# Patient Record
Sex: FEMALE | Race: WHITE | Employment: FULL TIME | URBAN - METROPOLITAN AREA
[De-identification: names, ages, dates, MRNs, and addresses within clinical notes are randomized per-mention and may not be internally consistent; named-entity substitution may affect disease eponyms.]

---

## 2022-03-19 PROBLEM — Z30.09 FAMILY PLANNING: Status: ACTIVE | Noted: 2019-12-16

## 2022-03-19 PROBLEM — Z87.42 HISTORY OF MENORRHAGIA: Status: ACTIVE | Noted: 2019-12-16

## 2022-05-22 ENCOUNTER — CLINICAL DOCUMENTATION (OUTPATIENT)
Dept: OBGYN CLINIC | Age: 32
End: 2022-05-22

## 2022-05-24 ENCOUNTER — TELEPHONE (OUTPATIENT)
Dept: OBGYN CLINIC | Age: 32
End: 2022-05-24

## 2022-05-24 NOTE — TELEPHONE ENCOUNTER
Attempted to contact patient to discuss possible dates for recommended surgery per Dr Paulino [facility] with ablation). N/A, L/M on voicemail for patient to return my call at her earliest convenience.     Pt called and surgery is scheduled for 7/27/22 per patients request.

## 2022-06-01 ENCOUNTER — HOSPITAL ENCOUNTER (OUTPATIENT)
Dept: MAMMOGRAPHY | Age: 32
Discharge: HOME | End: 2022-06-04
Payer: MEDICAID

## 2022-06-01 ENCOUNTER — APPOINTMENT (OUTPATIENT)
Dept: MAMMOGRAPHY | Age: 32
End: 2022-06-01
Payer: MEDICAID

## 2022-06-01 DIAGNOSIS — N63.10 MASS OF RIGHT BREAST: ICD-10-CM

## 2022-06-01 PROCEDURE — 76642 ULTRASOUND BREAST LIMITED: CPT

## 2022-06-01 PROCEDURE — 77066 DX MAMMO INCL CAD BI: CPT

## 2022-06-02 ENCOUNTER — PREP FOR PROCEDURE (OUTPATIENT)
Dept: OBGYN CLINIC | Age: 32
End: 2022-06-02

## 2022-06-02 NOTE — RESULT ENCOUNTER NOTE
Notify patient MMG and bilateral breast USs  are nl, keep an eye on her SBE, let us know if anything changes or she has concerns. Radiologist advises clinical FU, schedule a FU breast recheck ~ 12/2022    Radiologist's note:  \"Clinical follow-up of right breast lump is STRONGLY RECOMMENDED,  however. If any clinical suspicion for breast malignancy remains, then further  assessment should be currently performed. By imaging, a contrasted breast MRI  should be considered. Otherwise, the patient should be referred back sooner if  worrisome clinical findings arise such as enlarging lump or new focal symptoms  otherwise such as pain. This was also discussed with the patient who stated  Understanding. \"

## 2022-06-03 PROBLEM — N92.0 MENORRHAGIA: Status: ACTIVE | Noted: 2022-06-03

## 2022-06-06 ENCOUNTER — TELEPHONE (OUTPATIENT)
Dept: OBGYN CLINIC | Age: 32
End: 2022-06-06

## 2022-06-06 NOTE — TELEPHONE ENCOUNTER
Pt notified of following results:  - pap smear ascus (can't rule out HGSIL), HPV+. - vaginal screening negative (no yeast, bv, etc.)  - urine culture negative. Explained pap results, need for colpo. Scheduled colpo. Pt also informed that pre-op and post-op appointments set up for her would not work; rescheduled appointments to fit pt's schedule. Pt verbalized understanding, has no further questions or concerns at this time.

## 2022-06-06 NOTE — TELEPHONE ENCOUNTER
Notify pt:   1.  pap result (ascus can't r/o HGSIL, + HPV) needs colpo, please schedule with me   2.  Neg vaginal screening (no yeast, BV, trich)   3.  Neg urine cx     LM to return call to discuss results

## 2022-06-21 ENCOUNTER — PROCEDURE VISIT (OUTPATIENT)
Dept: OBGYN CLINIC | Age: 32
End: 2022-06-21
Payer: MEDICAID

## 2022-06-21 VITALS
BODY MASS INDEX: 29.81 KG/M2 | SYSTOLIC BLOOD PRESSURE: 118 MMHG | WEIGHT: 162 LBS | HEIGHT: 62 IN | DIASTOLIC BLOOD PRESSURE: 72 MMHG

## 2022-06-21 DIAGNOSIS — R87.611 PAP SMEAR OF CERVIX WITH ASCUS, CANNOT EXCLUDE HGSIL: ICD-10-CM

## 2022-06-21 DIAGNOSIS — Z01.812 PRE-PROCEDURE LAB EXAM: Primary | ICD-10-CM

## 2022-06-21 DIAGNOSIS — R87.810 ASCUS WITH POSITIVE HIGH RISK HPV CERVICAL: ICD-10-CM

## 2022-06-21 DIAGNOSIS — R87.610 ASCUS WITH POSITIVE HIGH RISK HPV CERVICAL: ICD-10-CM

## 2022-06-21 LAB
HCG, PREGNANCY, URINE, POC: NEGATIVE
VALID INTERNAL CONTROL, POC: YES

## 2022-06-21 PROCEDURE — 88305 TISSUE EXAM BY PATHOLOGIST: CPT

## 2022-06-21 PROCEDURE — 81025 URINE PREGNANCY TEST: CPT | Performed by: OBSTETRICS & GYNECOLOGY

## 2022-06-21 PROCEDURE — 57454 BX/CURETT OF CERVIX W/SCOPE: CPT | Performed by: OBSTETRICS & GYNECOLOGY

## 2022-06-22 ENCOUNTER — HOSPITAL ENCOUNTER (OUTPATIENT)
Dept: LAB | Age: 32
Discharge: HOME | End: 2022-06-25
Payer: MEDICAID

## 2022-06-28 PROBLEM — R87.611 PAP SMEAR OF CERVIX WITH ASCUS, CANNOT EXCLUDE HGSIL: Status: ACTIVE | Noted: 2022-06-28

## 2022-06-28 NOTE — PROGRESS NOTES
Colposcopy  Em Rosales  34 y.o.  1990    Today:  6/21/2022    Em Rosales presents today for a colposcopy procedure. Pap smear flow sheet:  5/16/22 pap:  ASCUS, can't r/o HGSIL, + HPV    Patient's last menstrual period was 06/19/2022. BC:   Depo-Provera injections (wants to stop depo provera). Allergies   Allergen Reactions    Amoxicillin-Pot Clavulanate Hives    Ciprofloxacin Other (See Comments)    Penicillins Other (See Comments)       Current Outpatient Medications   Medication Sig    albuterol (PROVENTIL) (2.5 MG/3ML) 0.083% nebulizer solution Inhale into the lungs once    buPROPion (WELLBUTRIN XL) 300 MG extended release tablet Take 300 mg by mouth    cetirizine (ZYRTEC) 10 MG tablet Take by mouth    escitalopram (LEXAPRO) 20 MG tablet Take 20 mg by mouth    ibuprofen (ADVIL;MOTRIN) 200 MG tablet Take by mouth    LORazepam (ATIVAN) 0.5 MG tablet Take by mouth. topiramate (TOPAMAX) 25 MG tablet Take by mouth 2 times daily     No current facility-administered medications for this visit.        Past Medical History:   Diagnosis Date    ADD (attention deficit disorder)     Allergic rhinitis     Asthma     Exacerbation 12/21/2020 seen at [facility] ER    Depression     Diabetes [facility])     gestational    Hypertension     Migraine     Psychiatric disorder     anxiety    Right wrist sprain 03/05/2021    [facility], Worker's Comp 3/2/2021    Sleep disorder     Vertigo        Past Surgical History:   Procedure Laterality Date    CHOLECYSTECTOMY         Family History   Problem Relation Age of Onset    Diabetes Mother     Deep Vein Thrombosis Neg Hx     Prostate Cancer Neg Hx     Ovarian Cancer Neg Hx     Breast Cancer Neg Hx     Colon Cancer Paternal Grandmother     Coronary Art Dis Maternal Aunt     Heart Disease Maternal Grandmother     Thyroid Disease Maternal Grandmother     Hypertension Maternal Grandmother     Coronary Art Dis Maternal Grandmother     Diabetes Maternal Grandmother     Hypertension Mother     Cancer Mother         Cervical ca, hysterectomy    Pulmonary Embolism Neg Hx     Coronary Art Dis Mother        Physical Exam   /72   Ht 5' 2\" (1.575 m)   Wt 162 lb (73.5 kg)   LMP 06/19/2022   BMI 29.63 kg/m²     External genitalia: normal appearing no erythema or lesions  Vagina: pink with normal rugations, no lesions, physiologic appearing discharge  Cervix:  normal appearing, no lesions    Colposcopy procedure:  Consent signed and time out performed. Patient positioned in lithotomy position. A bivalve speculum was placed into the vagina. The cervix was bathed in acetic acid followed by lugols solution. Colposcopy observations:  Satisfactory colpo, 360 degrees of glandular epithelium seen. An ECC was obtained. Acetowhite patch (lugols positive) noted @ 3, 6 & 9:00, bxs obtained at:  3, 6 & 9:00   Monsel's solution was applied for good hemostasis. Patient tolerated procedure well. A/P:  1. Abnormal Pap: Will notify pt of biopsy results and advise follow up treatment accordingly. 2.  Menorrhagia, sched'd for D & C Hysteroscopy w/ EA. Unclear what pt plans to do for contraception. FU 2 wk w/ US    3. Recent breast imaging-->  6/1/2022 Bilateral Dx'tic MMG and bilateral breast US   Noted @ last PE:    Right breast: 11:00 7 cm from the nipple -->nl  Noted during dx'tic MMG:  Left breast 4:00--> 5 x 3 x 5 mm benign cyst     Radiologist advised clinical FU, FU breast recheck scheduled~ 12/6/2022    Orders Placed This Encounter   Procedures    COLPOSC,CERVIX W/ADJ VAG,W/BX & CURRETAG     1. ECC 2. 3 o'clock 3. 6 o'clock 4. 9 o'clock    [facility] Surgical Pathology     1. ECC 2. 3 o'clock 3. 6 o'clock 4. 9 o'clock     Standing Status:   Future     Number of Occurrences:   1     Standing Expiration Date:   6/21/2023    AMB POC URINE PREGNANCY TEST, VISUAL COLOR COMPARISON       Dictated using voice recognition software.   Proofread but unrecognized errors may exist.    Margie Lowry MD, MD

## 2022-07-19 ENCOUNTER — TELEPHONE (OUTPATIENT)
Dept: OBGYN CLINIC | Age: 32
End: 2022-07-19

## 2022-07-19 RX ORDER — MEDROXYPROGESTERONE ACETATE 150 MG/ML
150 INJECTION, SUSPENSION INTRAMUSCULAR ONCE
Qty: 1 ML | Refills: 1 | Status: ON HOLD | OUTPATIENT
Start: 2022-07-19 | End: 2022-10-31 | Stop reason: HOSPADM

## 2022-07-19 NOTE — TELEPHONE ENCOUNTER
Pt called to cancel surgery and will call back when she is able to reschedule. Sugery and all upcoming appts pertaining to surgery have been cancelled.

## 2022-07-19 NOTE — TELEPHONE ENCOUNTER
Phone call from patient who has surgery scheduled 7-27-22 with Dr Oliver Moya but states she is sick with covid symptoms and will not be going forward with it at this time. Wants refill on depo until surgery can be rescheduled per patient. Rodri Mariscal made aware who will call patient regarding surgery and all appointments. 1 refill of depo sent to pharmacy and message to Dr Oliver Moya making her aware of patient's situation as well.

## 2022-08-24 ENCOUNTER — TELEPHONE (OUTPATIENT)
Dept: OBGYN CLINIC | Age: 32
End: 2022-08-24

## 2022-08-24 ENCOUNTER — CLINICAL DOCUMENTATION (OUTPATIENT)
Dept: OBGYN CLINIC | Age: 32
End: 2022-08-24

## 2022-08-24 DIAGNOSIS — N94.6 DYSMENORRHEA: ICD-10-CM

## 2022-08-24 DIAGNOSIS — R87.611 PAP SMEAR OF CERVIX WITH ASCUS, CANNOT EXCLUDE HGSIL: ICD-10-CM

## 2022-08-24 DIAGNOSIS — N92.4 MENORRHAGIA, PREMENOPAUSAL: Primary | ICD-10-CM

## 2022-08-24 RX ORDER — MEDROXYPROGESTERONE ACETATE 150 MG/ML
150 INJECTION, SUSPENSION INTRAMUSCULAR
Qty: 1 ML | Refills: 3 | Status: ON HOLD | OUTPATIENT
Start: 2022-08-24 | End: 2022-10-31 | Stop reason: HOSPADM

## 2022-08-24 NOTE — TELEPHONE ENCOUNTER
Attempted to contact patient to discuss possible dates for recommended surgery per Dr Phong [facility] Hysteroscopy D&C). N/A, L/M on voicemail for patient to return my call at her earliest convenience. Pt returned call and surgery is scheduled for 10/31/22.

## 2022-08-24 NOTE — RESULT ENCOUNTER NOTE
Called and d/w pt, had planned to discuss at her pre-op but surgery was cancelled. Advise CKC, pt is in agreement.

## 2022-08-30 ENCOUNTER — PREP FOR PROCEDURE (OUTPATIENT)
Dept: OBGYN CLINIC | Age: 32
End: 2022-08-30

## 2022-08-30 PROBLEM — N87.0 MILD DYSPLASIA OF CERVIX: Status: ACTIVE | Noted: 2022-08-30

## 2022-08-31 ENCOUNTER — CLINICAL DOCUMENTATION (OUTPATIENT)
Dept: OBGYN CLINIC | Age: 32
End: 2022-08-31

## 2022-08-31 NOTE — PROGRESS NOTES
Pt is scheduled for CKC and D&C on 10-31-22 with Dr Emily Martinez. Insurance pre-authorization is not required. Auth/Ref # non given. Pt has been notified of dates, times and locations.

## 2022-10-17 ENCOUNTER — OFFICE VISIT (OUTPATIENT)
Dept: OBGYN CLINIC | Age: 32
End: 2022-10-17

## 2022-10-17 VITALS — SYSTOLIC BLOOD PRESSURE: 128 MMHG | WEIGHT: 172.8 LBS | BODY MASS INDEX: 31.61 KG/M2 | DIASTOLIC BLOOD PRESSURE: 74 MMHG

## 2022-10-17 DIAGNOSIS — R87.610 ASCUS WITH POSITIVE HIGH RISK HPV CERVICAL: ICD-10-CM

## 2022-10-17 DIAGNOSIS — Z01.818 PRE-OP EXAM: Primary | ICD-10-CM

## 2022-10-17 DIAGNOSIS — N94.6 DYSMENORRHEA: ICD-10-CM

## 2022-10-17 DIAGNOSIS — R87.7 LOW GRADE SQUAMOUS INTRAEPITHELIAL LESION (LGSIL) ON BIOPSY OF CERVIX: ICD-10-CM

## 2022-10-17 DIAGNOSIS — N92.4 MENORRHAGIA, PREMENOPAUSAL: ICD-10-CM

## 2022-10-17 DIAGNOSIS — R87.810 ASCUS WITH POSITIVE HIGH RISK HPV CERVICAL: ICD-10-CM

## 2022-10-17 DIAGNOSIS — G89.18 POST-OP PAIN: ICD-10-CM

## 2022-10-17 RX ORDER — OXYCODONE HYDROCHLORIDE 5 MG/1
5 TABLET ORAL EVERY 6 HOURS PRN
Qty: 12 TABLET | Refills: 0 | Status: SHIPPED | OUTPATIENT
Start: 2022-10-17 | End: 2022-10-20

## 2022-10-17 RX ORDER — NAPROXEN 500 MG/1
500 TABLET ORAL 2 TIMES DAILY WITH MEALS
Qty: 20 TABLET | Refills: 0 | Status: SHIPPED | OUTPATIENT
Start: 2022-10-17

## 2022-10-17 RX ORDER — DEXTROAMPHETAMINE SACCHARATE, AMPHETAMINE ASPARTATE, DEXTROAMPHETAMINE SULFATE AND AMPHETAMINE SULFATE 5; 5; 5; 5 MG/1; MG/1; MG/1; MG/1
TABLET ORAL
COMMUNITY
Start: 2022-07-05

## 2022-10-17 NOTE — H&P (VIEW-ONLY)
Pre op  Exam    Rex Borrero  1990  34 y.o.  [id number]    Today: 10/17/2022    Presents for PRE-OP     1)  6/2022 Pap: ASCUS can't r/o HGSIL, colpo LGSIL. 2)  Menorrhagia & dysmenorrhea, wants concomitant D&C hysteroscopy, with endometrial ablation    Gets premenstrual cramping  States she is not opposed to hysterectomy if indicated. 1/2020 SF US:    Slightly Enlarged retroverted uterus = 5wks, 6.4/4.5/4, vol 62   Endometrium = 4.1mm and appears normal   Rt ovary is normal.   Lt ovary is normal.   No free fluid noted in the pelvis. 6/2022 Pap: ASCUS can't r/o HGSIL, colpo LGSIL. 6/2022 Colpo:  DIAGNOSIS        A:  \"ECC\":  BENIGN ENDOCERVICAL EPITHELIUM. B:  \"CERVICAL BIOPSY AT 3 O'CLOCK\":  BENIGN SQUAMOUS MUCOSA. C:  \"CERVICAL BIOPSY AT 6 O'CLOCK\":  LOW GRADE SQUAMOUS   INTRAEPITHELIAL LESION (LGSIL). D:  \"CERVICAL BIOPSY AT 9 O'CLOCK\":  BENIGN SQUAMOUS MUCOSA. No LMP recorded (lmp unknown). BC:   Depo-Provera injections. 6/21/2022   HPI:  ASCUS can't r/o HGSIL, colpo LGSIL. Menorrhagia & dysmenorrhea, wants concomitant D&C hysteroscopy, with endometrial ablation. Understands she needs that makes her life so much easier, fiber in the past that the [facility]  hysteroscopy has to be done after the [facility] to avoid compromising the endocervical canal but that is probably more of an issue when there is a high in likelihood of finding a high-grade lesion    Childbearing is complete. Current contraception: Depo-Provera  Pap smear flow sheet:  5/16/22 pap:  ASCUS, can't r/o HGSIL, + HPV    LMP 06/19/2022. BC:   Depo-Provera injections (wants to stop depo provera).     [id number], presents today with concerns regarding:          Review of Systems   Genitourinary:         Menorrhagia  Dysmenorrhea        Physical Exam:  /74   Wt 172 lb 12.8 oz (78.4 kg)   LMP  (LMP Unknown)   BMI 31.61 kg/m²   Rex Borrero is a 34 y.o. female who appears pleasant, in no apparent distress, her given age, well developed, well nourished and with good attention to hygiene and body habitus. Oriented to person, place and time. Mood and affect normal and appropriate to situation. Head is normocephalic, atraumatic, without any gross head or neck masses. Lymph nodes: No groin lymphadenopathy noted. Respiratory: Assessment of respiratory effort reveals even and non labored respirations. Lungs CTA. Cardiovascular: Heart auscultation reveals no murmurs, gallop, rubs or clicks. Skin: No skin rash, abnormal appearing nevi, subcutaneous nodules, lesions or ulcers observed. Abdomen: Abdomen soft, non tender, without palpable masses. Palpation of liver reveals no abnormalities with respect to size, tenderness or masses. Palpation of spleen reveals no abnormalities with respect to size, tenderness or masses. Genitourinary:   External genitalia are normal in appearance. Examination of urethral meatus reveals location normal and size normal.   Examination of urethra shows no abnormalities. Examination of vaginal vault reveals no abnormalities. Cervix: shows no pathology. Uterus:  normal size shape and contour  Adnexa and parametria show no masses, tenderness, organomegaly or nodularity. Examination of anus and perineum shows no abnormalities. A/P:  1. Pre op: CKC and D& C Hysteroscopy with endometrial ablation discussed with patient. She understands that complications aren't anticipated however if complications occur they could necessitate:   transfusion, HARJIT, ureteral stint, prolonged greer drainage, colostomy &/or other procedures as indicated. Pt understands/consents. The anticipated outcomes associated with Endometrial Ablation were reviewed --> 33% of patients have amenorrhea 33% have improved vaginal bleeding and 33% see no improvement the patient may even see a worsening of their symptoms.   I advised the patient that Adenomyosis may be associated with a poorer outcome. The diagnosis was discussed and all questions were answered to the satisfaction of the patient. The importance of continued reliable contraception following an ablation was discussed. BC:   Pt understands pregnancy is contraindicated following an ablation. Post op course  also discussed. Advised pelvic rest and not to engage in strenuous activity until her post op check ~ 2 wk post op. No driving for the first 48-72 hours post op or if she is taking narcotic pain medication. Pt is aware she will have some light VB for several days to weeks post op. All questions answered. Infection precautions reviewed. 2.  Clinical situation and discussed with Dr. Leigh Hubbard, he states it to combine the procedures and begin with the [facility]  hysteroscopy/endometrial ablation      Diagnosis Orders   1. Pre-op exam  CBC    Urine culture    Comprehensive Metabolic Panel    Comprehensive Metabolic Panel    CBC      2. Post-op pain  naproxen (EC-NAPROSYN) 500 MG EC tablet    oxyCODONE (ROXICODONE) 5 MG immediate release tablet      3. Menorrhagia, premenopausal        4. Dysmenorrhea        5. ASCUS with positive high risk HPV cervical        6. Low grade squamous intraepithelial lesion (LGSIL) on biopsy of cervix                Orders Placed This Encounter   Procedures    Urine culture     Order Specific Question:   Specify (ex-cath, midstream, cysto, etc)?      Answer:   midstream clean catch    CBC     Standing Status:   Future     Number of Occurrences:   1     Standing Expiration Date:   10/17/2023    Comprehensive Metabolic Panel     Standing Status:   Future     Number of Occurrences:   1     Standing Expiration Date:   10/17/2023     Orders Placed This Encounter   Medications    naproxen (EC-NAPROSYN) 500 MG EC tablet     Sig: Take 1 tablet by mouth 2 times daily (with meals) For postop pain     Dispense:  20 tablet     Refill:  0    oxyCODONE (ROXICODONE) 5 MG immediate release tablet     Sig: Take 1 tablet by mouth every 6 hours as needed for Pain for up to 3 days. Intended supply: 3 days.  Take lowest dose possible to manage pain     Dispense:  12 tablet     Refill:  0     Reduce doses taken as pain becomes manageable     Signed by:  Gemma Jacome MD, Blair Cedillo

## 2022-10-17 NOTE — PROGRESS NOTES
Pre op  Exam    Melvin Simms  1990  34 y.o.  [id number]    Today: 10/17/2022    Presents for PRE-OP     1)  6/2022 Pap: ASCUS can't r/o HGSIL, colpo LGSIL. 2)  Menorrhagia & dysmenorrhea, wants concomitant D&C hysteroscopy, with endometrial ablation    Gets premenstrual cramping  States she is not opposed to hysterectomy if indicated. 1/2020 SF US:    Slightly Enlarged retroverted uterus = 5wks, 6.4/4.5/4, vol 62   Endometrium = 4.1mm and appears normal   Rt ovary is normal.   Lt ovary is normal.   No free fluid noted in the pelvis. 6/2022 Pap: ASCUS can't r/o HGSIL, colpo LGSIL. 6/2022 Colpo:  DIAGNOSIS        A:  \"ECC\":  BENIGN ENDOCERVICAL EPITHELIUM. B:  \"CERVICAL BIOPSY AT 3 O'CLOCK\":  BENIGN SQUAMOUS MUCOSA. C:  \"CERVICAL BIOPSY AT 6 O'CLOCK\":  LOW GRADE SQUAMOUS   INTRAEPITHELIAL LESION (LGSIL). D:  \"CERVICAL BIOPSY AT 9 O'CLOCK\":  BENIGN SQUAMOUS MUCOSA. No LMP recorded (lmp unknown). BC:   Depo-Provera injections. 6/21/2022   HPI:  ASCUS can't r/o HGSIL, colpo LGSIL. Menorrhagia & dysmenorrhea, wants concomitant D&C hysteroscopy, with endometrial ablation. Understands she needs that makes her life so much easier, fiber in the past that the [facility]  hysteroscopy has to be done after the [facility] to avoid compromising the endocervical canal but that is probably more of an issue when there is a high in likelihood of finding a high-grade lesion    Childbearing is complete. Current contraception: Depo-Provera  Pap smear flow sheet:  5/16/22 pap:  ASCUS, can't r/o HGSIL, + HPV    LMP 06/19/2022. BC:   Depo-Provera injections (wants to stop depo provera).     [id number], presents today with concerns regarding:          Review of Systems   Genitourinary:         Menorrhagia  Dysmenorrhea        Physical Exam:  /74   Wt 172 lb 12.8 oz (78.4 kg)   LMP  (LMP Unknown)   BMI 31.61 kg/m²   Melvin Simms is a 34 y.o. female who appears pleasant, in no apparent distress, her given age, well developed, well nourished and with good attention to hygiene and body habitus. Oriented to person, place and time. Mood and affect normal and appropriate to situation. Head is normocephalic, atraumatic, without any gross head or neck masses. Lymph nodes: No groin lymphadenopathy noted. Respiratory: Assessment of respiratory effort reveals even and non labored respirations. Lungs CTA. Cardiovascular: Heart auscultation reveals no murmurs, gallop, rubs or clicks. Skin: No skin rash, abnormal appearing nevi, subcutaneous nodules, lesions or ulcers observed. Abdomen: Abdomen soft, non tender, without palpable masses. Palpation of liver reveals no abnormalities with respect to size, tenderness or masses. Palpation of spleen reveals no abnormalities with respect to size, tenderness or masses. Genitourinary:   External genitalia are normal in appearance. Examination of urethral meatus reveals location normal and size normal.   Examination of urethra shows no abnormalities. Examination of vaginal vault reveals no abnormalities. Cervix: shows no pathology. Uterus:  normal size shape and contour  Adnexa and parametria show no masses, tenderness, organomegaly or nodularity. Examination of anus and perineum shows no abnormalities. A/P:  1. Pre op: CKC and D& C Hysteroscopy with endometrial ablation discussed with patient. She understands that complications aren't anticipated however if complications occur they could necessitate:   transfusion, HARJIT, ureteral stint, prolonged greer drainage, colostomy &/or other procedures as indicated. Pt understands/consents. The anticipated outcomes associated with Endometrial Ablation were reviewed --> 33% of patients have amenorrhea 33% have improved vaginal bleeding and 33% see no improvement the patient may even see a worsening of their symptoms.   I advised the patient that Adenomyosis may be associated with a poorer outcome. The diagnosis was discussed and all questions were answered to the satisfaction of the patient. The importance of continued reliable contraception following an ablation was discussed. BC:   Pt understands pregnancy is contraindicated following an ablation. Post op course  also discussed. Advised pelvic rest and not to engage in strenuous activity until her post op check ~ 2 wk post op. No driving for the first 48-72 hours post op or if she is taking narcotic pain medication. Pt is aware she will have some light VB for several days to weeks post op. All questions answered. Infection precautions reviewed. 2.  Clinical situation and discussed with Dr. Gael Mancuso, he states it to combine the procedures and begin with the [facility]  hysteroscopy/endometrial ablation      Diagnosis Orders   1. Pre-op exam  CBC    Urine culture    Comprehensive Metabolic Panel    Comprehensive Metabolic Panel    CBC      2. Post-op pain  naproxen (EC-NAPROSYN) 500 MG EC tablet    oxyCODONE (ROXICODONE) 5 MG immediate release tablet      3. Menorrhagia, premenopausal        4. Dysmenorrhea        5. ASCUS with positive high risk HPV cervical        6. Low grade squamous intraepithelial lesion (LGSIL) on biopsy of cervix                Orders Placed This Encounter   Procedures    Urine culture     Order Specific Question:   Specify (ex-cath, midstream, cysto, etc)?      Answer:   midstream clean catch    CBC     Standing Status:   Future     Number of Occurrences:   1     Standing Expiration Date:   10/17/2023    Comprehensive Metabolic Panel     Standing Status:   Future     Number of Occurrences:   1     Standing Expiration Date:   10/17/2023     Orders Placed This Encounter   Medications    naproxen (EC-NAPROSYN) 500 MG EC tablet     Sig: Take 1 tablet by mouth 2 times daily (with meals) For postop pain     Dispense:  20 tablet     Refill:  0    oxyCODONE (ROXICODONE) 5 MG immediate release tablet     Sig: Take 1 tablet by mouth every 6 hours as needed for Pain for up to 3 days. Intended supply: 3 days.  Take lowest dose possible to manage pain     Dispense:  12 tablet     Refill:  0     Reduce doses taken as pain becomes manageable     Signed by:  Fahad Light MD, Aurea Bridges

## 2022-10-18 LAB
ALBUMIN SERPL-MCNC: 4 G/DL (ref 3.5–5)
ALBUMIN/GLOB SERPL: 1.1 {RATIO} (ref 0.4–1.6)
ALP SERPL-CCNC: 116 U/L (ref 50–136)
ALT SERPL-CCNC: 29 U/L (ref 12–65)
ANION GAP SERPL CALC-SCNC: 6 MMOL/L (ref 2–11)
AST SERPL-CCNC: 11 U/L (ref 15–37)
BILIRUB SERPL-MCNC: 0.2 MG/DL (ref 0.2–1.1)
BUN SERPL-MCNC: 12 MG/DL (ref 6–23)
CALCIUM SERPL-MCNC: 9.9 MG/DL (ref 8.3–10.4)
CHLORIDE SERPL-SCNC: 107 MMOL/L (ref 101–110)
CO2 SERPL-SCNC: 26 MMOL/L (ref 21–32)
CREAT SERPL-MCNC: 0.8 MG/DL (ref 0.6–1)
ERYTHROCYTE [DISTWIDTH] IN BLOOD BY AUTOMATED COUNT: 13.2 % (ref 11.9–14.6)
GLOBULIN SER CALC-MCNC: 3.7 G/DL (ref 2.8–4.5)
GLUCOSE SERPL-MCNC: 77 MG/DL (ref 65–100)
HCT VFR BLD AUTO: 45.8 % (ref 35.8–46.3)
HGB BLD-MCNC: 14 G/DL (ref 11.7–15.4)
MCH RBC QN AUTO: 28.3 PG (ref 26.1–32.9)
MCHC RBC AUTO-ENTMCNC: 30.6 G/DL (ref 31.4–35)
MCV RBC AUTO: 92.7 FL (ref 82–102)
NRBC # BLD: 0 K/UL (ref 0–0.2)
PLATELET # BLD AUTO: 296 K/UL (ref 150–450)
PMV BLD AUTO: 10.6 FL (ref 9.4–12.3)
POTASSIUM SERPL-SCNC: 4.2 MMOL/L (ref 3.5–5.1)
PROT SERPL-MCNC: 7.7 G/DL (ref 6.3–8.2)
RBC # BLD AUTO: 4.94 M/UL (ref 4.05–5.2)
SODIUM SERPL-SCNC: 139 MMOL/L (ref 133–143)
WBC # BLD AUTO: 10.5 K/UL (ref 4.3–11.1)

## 2022-10-20 LAB
BACTERIA SPEC CULT: NORMAL
SERVICE CMNT-IMP: NORMAL

## 2022-10-28 RX ORDER — SODIUM CHLORIDE 0.9 % (FLUSH) 0.9 %
5-40 SYRINGE (ML) INJECTION EVERY 12 HOURS SCHEDULED
Status: CANCELLED | OUTPATIENT
Start: 2022-10-31

## 2022-10-28 RX ORDER — SODIUM CHLORIDE 0.9 % (FLUSH) 0.9 %
5-40 SYRINGE (ML) INJECTION PRN
Status: CANCELLED | OUTPATIENT
Start: 2022-10-31

## 2022-10-28 NOTE — PERIOP NOTE
Patient verified name and . Order for consent not found in EHR patient verifies procedure. Type 1b surgery, Phone assessment complete. Orders not received. Labs per surgeon: none  Labs per anesthesia protocol: none    Patient answered medical/surgical history questions at their best of ability. All prior to admission medications documented in [facility]. Patient instructed to take the following medications the day of surgery according to anesthesia guidelines with a small sip of water: ADDERRAL, ZYRTEC PRN, ATIVAN PRN. Hold all vitamins 7 days prior to surgery and NSAIDS 5 days prior to surgery. Prescription meds to hold:NONE  Patient instructed on the following:    > Arrive at [address] [facility], time of arrival to be called the day before by 1700  > NPO after midnight, unless otherwise indicated, including gum, mints, and ice chips  > Responsible adult must drive patient to the hospital, stay during surgery, and patient will need supervision 24 hours after anesthesia  > Use antibacterial soap in shower the night before surgery and on the morning of surgery  > All piercings must be removed prior to arrival.    > Leave all valuables (money and jewelry) at home but bring insurance card and ID on DOS.   > Do not wear make-up, nail polish, lotions, cologne, perfumes, powders, or oil on skin. Artificial nails are not permitted.

## 2022-10-30 ENCOUNTER — ANESTHESIA EVENT (OUTPATIENT)
Dept: SURGERY | Age: 32
End: 2022-10-30
Payer: MEDICAID

## 2022-10-31 ENCOUNTER — ANESTHESIA (OUTPATIENT)
Dept: SURGERY | Age: 32
End: 2022-10-31
Payer: MEDICAID

## 2022-10-31 ENCOUNTER — HOSPITAL ENCOUNTER (OUTPATIENT)
Age: 32
Setting detail: OUTPATIENT SURGERY
Discharge: HOME | End: 2022-10-31
Attending: OBSTETRICS & GYNECOLOGY | Admitting: OBSTETRICS & GYNECOLOGY
Payer: MEDICAID

## 2022-10-31 VITALS
DIASTOLIC BLOOD PRESSURE: 82 MMHG | WEIGHT: 175.5 LBS | OXYGEN SATURATION: 98 % | SYSTOLIC BLOOD PRESSURE: 130 MMHG | RESPIRATION RATE: 16 BRPM | TEMPERATURE: 98.6 F | HEIGHT: 62 IN | BODY MASS INDEX: 32.3 KG/M2 | HEART RATE: 99 BPM

## 2022-10-31 DIAGNOSIS — N87.0 MILD DYSPLASIA OF CERVIX: ICD-10-CM

## 2022-10-31 DIAGNOSIS — R87.611 PAP SMEAR OF CERVIX WITH ASCUS, CANNOT EXCLUDE HGSIL: Primary | ICD-10-CM

## 2022-10-31 DIAGNOSIS — Z30.011 ENCOUNTER FOR INITIAL PRESCRIPTION OF CONTRACEPTIVE PILLS: ICD-10-CM

## 2022-10-31 DIAGNOSIS — N94.6 DYSMENORRHEA: ICD-10-CM

## 2022-10-31 DIAGNOSIS — N92.0 MENORRHAGIA WITH REGULAR CYCLE: ICD-10-CM

## 2022-10-31 LAB
ABO + RH BLD: NORMAL
BLOOD GROUP ANTIBODIES SERPL: NORMAL
HCG UR QL: NEGATIVE
SPECIMEN EXP DATE BLD: NORMAL

## 2022-10-31 PROCEDURE — 6370000000 HC RX 637 (ALT 250 FOR IP): Performed by: ANESTHESIOLOGY

## 2022-10-31 PROCEDURE — 86901 BLOOD TYPING SEROLOGIC RH(D): CPT

## 2022-10-31 PROCEDURE — 6360000002 HC RX W HCPCS: Performed by: OBSTETRICS & GYNECOLOGY

## 2022-10-31 PROCEDURE — 2500000003 HC RX 250 WO HCPCS: Performed by: NURSE ANESTHETIST, CERTIFIED REGISTERED

## 2022-10-31 PROCEDURE — C1713 ANCHOR/SCREW BN/BN,TIS/BN: HCPCS | Performed by: OBSTETRICS & GYNECOLOGY

## 2022-10-31 PROCEDURE — 2580000003 HC RX 258: Performed by: ANESTHESIOLOGY

## 2022-10-31 PROCEDURE — 57520 CONIZATION OF CERVIX: CPT | Performed by: OBSTETRICS & GYNECOLOGY

## 2022-10-31 PROCEDURE — 3700000001 HC ADD 15 MINUTES (ANESTHESIA): Performed by: OBSTETRICS & GYNECOLOGY

## 2022-10-31 PROCEDURE — 7100000001 HC PACU RECOVERY - ADDTL 15 MIN: Performed by: OBSTETRICS & GYNECOLOGY

## 2022-10-31 PROCEDURE — 2709999900 HC NON-CHARGEABLE SUPPLY: Performed by: OBSTETRICS & GYNECOLOGY

## 2022-10-31 PROCEDURE — 88307 TISSUE EXAM BY PATHOLOGIST: CPT

## 2022-10-31 PROCEDURE — 3600000003 HC SURGERY LEVEL 3 BASE: Performed by: OBSTETRICS & GYNECOLOGY

## 2022-10-31 PROCEDURE — 7100000010 HC PHASE II RECOVERY - FIRST 15 MIN: Performed by: OBSTETRICS & GYNECOLOGY

## 2022-10-31 PROCEDURE — 58563 HYSTEROSCOPY ABLATION: CPT | Performed by: OBSTETRICS & GYNECOLOGY

## 2022-10-31 PROCEDURE — 3700000000 HC ANESTHESIA ATTENDED CARE: Performed by: OBSTETRICS & GYNECOLOGY

## 2022-10-31 PROCEDURE — 7100000011 HC PHASE II RECOVERY - ADDTL 15 MIN: Performed by: OBSTETRICS & GYNECOLOGY

## 2022-10-31 PROCEDURE — 88305 TISSUE EXAM BY PATHOLOGIST: CPT

## 2022-10-31 PROCEDURE — 6370000000 HC RX 637 (ALT 250 FOR IP): Performed by: OBSTETRICS & GYNECOLOGY

## 2022-10-31 PROCEDURE — 7100000000 HC PACU RECOVERY - FIRST 15 MIN: Performed by: OBSTETRICS & GYNECOLOGY

## 2022-10-31 PROCEDURE — 6360000002 HC RX W HCPCS: Performed by: NURSE ANESTHETIST, CERTIFIED REGISTERED

## 2022-10-31 PROCEDURE — 81025 URINE PREGNANCY TEST: CPT

## 2022-10-31 PROCEDURE — 3600000013 HC SURGERY LEVEL 3 ADDTL 15MIN: Performed by: OBSTETRICS & GYNECOLOGY

## 2022-10-31 PROCEDURE — 6360000002 HC RX W HCPCS: Performed by: ANESTHESIOLOGY

## 2022-10-31 RX ORDER — FENTANYL CITRATE 50 UG/ML
INJECTION, SOLUTION INTRAMUSCULAR; INTRAVENOUS PRN
Status: DISCONTINUED | OUTPATIENT
Start: 2022-10-31 | End: 2022-10-31 | Stop reason: SDUPTHER

## 2022-10-31 RX ORDER — ONDANSETRON 2 MG/ML
4 INJECTION INTRAMUSCULAR; INTRAVENOUS
Status: COMPLETED | OUTPATIENT
Start: 2022-10-31 | End: 2022-10-31

## 2022-10-31 RX ORDER — IODINE SOLUTION STRONG 5% (LUGOL'S) 5 %
SOLUTION ORAL PRN
Status: DISCONTINUED | OUTPATIENT
Start: 2022-10-31 | End: 2022-10-31 | Stop reason: HOSPADM

## 2022-10-31 RX ORDER — OXYCODONE HYDROCHLORIDE 5 MG/1
5 TABLET ORAL PRN
Status: COMPLETED | OUTPATIENT
Start: 2022-10-31 | End: 2022-10-31

## 2022-10-31 RX ORDER — MIDAZOLAM HYDROCHLORIDE 2 MG/2ML
2 INJECTION, SOLUTION INTRAMUSCULAR; INTRAVENOUS
Status: DISCONTINUED | OUTPATIENT
Start: 2022-10-31 | End: 2022-10-31 | Stop reason: HOSPADM

## 2022-10-31 RX ORDER — HYDROMORPHONE HYDROCHLORIDE 1 MG/ML
0.5 INJECTION, SOLUTION INTRAMUSCULAR; INTRAVENOUS; SUBCUTANEOUS EVERY 5 MIN PRN
Status: DISCONTINUED | OUTPATIENT
Start: 2022-10-31 | End: 2022-10-31 | Stop reason: HOSPADM

## 2022-10-31 RX ORDER — LIDOCAINE HYDROCHLORIDE 20 MG/ML
INJECTION, SOLUTION EPIDURAL; INFILTRATION; INTRACAUDAL; PERINEURAL PRN
Status: DISCONTINUED | OUTPATIENT
Start: 2022-10-31 | End: 2022-10-31 | Stop reason: SDUPTHER

## 2022-10-31 RX ORDER — SODIUM CHLORIDE 0.9 % (FLUSH) 0.9 %
5-40 SYRINGE (ML) INJECTION PRN
Status: DISCONTINUED | OUTPATIENT
Start: 2022-10-31 | End: 2022-10-31 | Stop reason: HOSPADM

## 2022-10-31 RX ORDER — KETOROLAC TROMETHAMINE 30 MG/ML
30 INJECTION, SOLUTION INTRAMUSCULAR; INTRAVENOUS ONCE
Status: COMPLETED | OUTPATIENT
Start: 2022-10-31 | End: 2022-10-31

## 2022-10-31 RX ORDER — DEXAMETHASONE SODIUM PHOSPHATE 10 MG/ML
INJECTION INTRAMUSCULAR; INTRAVENOUS PRN
Status: DISCONTINUED | OUTPATIENT
Start: 2022-10-31 | End: 2022-10-31 | Stop reason: SDUPTHER

## 2022-10-31 RX ORDER — SODIUM CHLORIDE 9 MG/ML
INJECTION, SOLUTION INTRAVENOUS PRN
Status: DISCONTINUED | OUTPATIENT
Start: 2022-10-31 | End: 2022-10-31 | Stop reason: HOSPADM

## 2022-10-31 RX ORDER — SODIUM CHLORIDE, SODIUM LACTATE, POTASSIUM CHLORIDE, CALCIUM CHLORIDE 600; 310; 30; 20 MG/100ML; MG/100ML; MG/100ML; MG/100ML
INJECTION, SOLUTION INTRAVENOUS CONTINUOUS
Status: DISCONTINUED | OUTPATIENT
Start: 2022-10-31 | End: 2022-10-31 | Stop reason: HOSPADM

## 2022-10-31 RX ORDER — DROSPIRENONE AND ETHINYL ESTRADIOL 0.02-3(28)
1 KIT ORAL DAILY
Qty: 3 PACKET | Refills: 3 | Status: SHIPPED | OUTPATIENT
Start: 2022-10-31

## 2022-10-31 RX ORDER — EPHEDRINE SULFATE/0.9% NACL/PF 50 MG/5 ML
SYRINGE (ML) INTRAVENOUS PRN
Status: DISCONTINUED | OUTPATIENT
Start: 2022-10-31 | End: 2022-10-31 | Stop reason: SDUPTHER

## 2022-10-31 RX ORDER — LIDOCAINE HYDROCHLORIDE 10 MG/ML
1 INJECTION, SOLUTION INFILTRATION; PERINEURAL
Status: DISCONTINUED | OUTPATIENT
Start: 2022-10-31 | End: 2022-10-31 | Stop reason: HOSPADM

## 2022-10-31 RX ORDER — ONDANSETRON 2 MG/ML
INJECTION INTRAMUSCULAR; INTRAVENOUS PRN
Status: DISCONTINUED | OUTPATIENT
Start: 2022-10-31 | End: 2022-10-31 | Stop reason: SDUPTHER

## 2022-10-31 RX ORDER — SODIUM CHLORIDE 0.9 % (FLUSH) 0.9 %
5-40 SYRINGE (ML) INJECTION EVERY 12 HOURS SCHEDULED
Status: DISCONTINUED | OUTPATIENT
Start: 2022-10-31 | End: 2022-10-31 | Stop reason: HOSPADM

## 2022-10-31 RX ORDER — PHENYLEPHRINE HYDROCHLORIDE 10 MG/ML
INJECTION INTRAVENOUS PRN
Status: DISCONTINUED | OUTPATIENT
Start: 2022-10-31 | End: 2022-10-31 | Stop reason: HOSPADM

## 2022-10-31 RX ORDER — ACETAMINOPHEN 500 MG
1000 TABLET ORAL ONCE
Status: COMPLETED | OUTPATIENT
Start: 2022-10-31 | End: 2022-10-31

## 2022-10-31 RX ORDER — HYDROMORPHONE HYDROCHLORIDE 1 MG/ML
0.25 INJECTION, SOLUTION INTRAMUSCULAR; INTRAVENOUS; SUBCUTANEOUS EVERY 5 MIN PRN
Status: DISCONTINUED | OUTPATIENT
Start: 2022-10-31 | End: 2022-10-31 | Stop reason: HOSPADM

## 2022-10-31 RX ORDER — OXYCODONE HYDROCHLORIDE 5 MG/1
10 TABLET ORAL PRN
Status: COMPLETED | OUTPATIENT
Start: 2022-10-31 | End: 2022-10-31

## 2022-10-31 RX ORDER — FERRIC SUBSULFATE 20-22G/100
SOLUTION, NON-ORAL MISCELLANEOUS PRN
Status: DISCONTINUED | OUTPATIENT
Start: 2022-10-31 | End: 2022-10-31 | Stop reason: HOSPADM

## 2022-10-31 RX ORDER — PROPOFOL 10 MG/ML
INJECTION, EMULSION INTRAVENOUS PRN
Status: DISCONTINUED | OUTPATIENT
Start: 2022-10-31 | End: 2022-10-31 | Stop reason: SDUPTHER

## 2022-10-31 RX ADMIN — ONDANSETRON 4 MG: 2 INJECTION INTRAMUSCULAR; INTRAVENOUS at 07:45

## 2022-10-31 RX ADMIN — Medication 10 MG: at 08:10

## 2022-10-31 RX ADMIN — LIDOCAINE HYDROCHLORIDE 60 MG: 20 INJECTION, SOLUTION EPIDURAL; INFILTRATION; INTRACAUDAL; PERINEURAL at 07:30

## 2022-10-31 RX ADMIN — ACETAMINOPHEN 1000 MG: 500 TABLET, FILM COATED ORAL at 05:56

## 2022-10-31 RX ADMIN — HYDROMORPHONE HYDROCHLORIDE 0.5 MG: 1 INJECTION, SOLUTION INTRAMUSCULAR; INTRAVENOUS; SUBCUTANEOUS at 09:19

## 2022-10-31 RX ADMIN — HYDROMORPHONE HYDROCHLORIDE 0.5 MG: 1 INJECTION, SOLUTION INTRAMUSCULAR; INTRAVENOUS; SUBCUTANEOUS at 09:14

## 2022-10-31 RX ADMIN — SODIUM CHLORIDE, SODIUM LACTATE, POTASSIUM CHLORIDE, AND CALCIUM CHLORIDE: 600; 310; 30; 20 INJECTION, SOLUTION INTRAVENOUS at 06:03

## 2022-10-31 RX ADMIN — DEXAMETHASONE SODIUM PHOSPHATE 8 MG: 10 INJECTION INTRAMUSCULAR; INTRAVENOUS at 07:45

## 2022-10-31 RX ADMIN — ONDANSETRON 4 MG: 2 INJECTION INTRAMUSCULAR; INTRAVENOUS at 09:16

## 2022-10-31 RX ADMIN — SODIUM CHLORIDE, SODIUM LACTATE, POTASSIUM CHLORIDE, AND CALCIUM CHLORIDE: 600; 310; 30; 20 INJECTION, SOLUTION INTRAVENOUS at 08:24

## 2022-10-31 RX ADMIN — KETOROLAC TROMETHAMINE 30 MG: 30 INJECTION, SOLUTION INTRAMUSCULAR at 09:38

## 2022-10-31 RX ADMIN — OXYCODONE 5 MG: 5 TABLET ORAL at 09:38

## 2022-10-31 RX ADMIN — PROPOFOL 200 MG: 10 INJECTION, EMULSION INTRAVENOUS at 07:30

## 2022-10-31 RX ADMIN — FENTANYL CITRATE 100 MCG: 50 INJECTION, SOLUTION INTRAMUSCULAR; INTRAVENOUS at 07:30

## 2022-10-31 RX ADMIN — Medication 10 MG: at 08:24

## 2022-10-31 ASSESSMENT — PAIN SCALES - GENERAL
PAINLEVEL_OUTOF10: 9
PAINLEVEL_OUTOF10: 4
PAINLEVEL_OUTOF10: 7

## 2022-10-31 ASSESSMENT — PAIN DESCRIPTION - ONSET: ONSET: ON-GOING

## 2022-10-31 ASSESSMENT — PAIN DESCRIPTION - DESCRIPTORS: DESCRIPTORS: CRAMPING

## 2022-10-31 ASSESSMENT — PAIN DESCRIPTION - PAIN TYPE: TYPE: SURGICAL PAIN

## 2022-10-31 ASSESSMENT — PAIN DESCRIPTION - ORIENTATION: ORIENTATION: LOWER

## 2022-10-31 ASSESSMENT — PAIN - FUNCTIONAL ASSESSMENT: PAIN_FUNCTIONAL_ASSESSMENT: 0-10

## 2022-10-31 ASSESSMENT — PAIN DESCRIPTION - LOCATION: LOCATION: ABDOMEN

## 2022-10-31 ASSESSMENT — PAIN DESCRIPTION - FREQUENCY: FREQUENCY: CONTINUOUS

## 2022-10-31 NOTE — ANESTHESIA PRE PROCEDURE
Department of Anesthesiology  Preprocedure Note       Name:  Enriqueta Abel   Age:  34 y.o.  :  1990                                          MRN:  [id number]         Date:  10/31/2022      Surgeon: Sadie Cabrera):  Meghna Yates MD    Procedure: Procedure(s):  CERVIX CONE BIOPSY  DILATATION AND CURETTAGE    Medications prior to admission:   Prior to Admission medications    Medication Sig Start Date End Date Taking? Authorizing Provider   amphetamine-dextroamphetamine (ADDERALL) 20 MG tablet  22   Historical Provider, MD   naproxen (EC-NAPROSYN) 500 MG EC tablet Take 1 tablet by mouth 2 times daily (with meals) For postop pain  Patient not taking: Reported on 10/28/2022 10/17/22   Meghna Yates MD   medroxyPROGESTERone (DEPO-PROVERA) 150 MG/ML injection Inject 1 mL into the muscle every 3 months 22   Meghna Yates MD   medroxyPROGESTERone (DEPO-PROVERA) 150 MG/ML injection Inject 1 mL into the muscle once for 1 dose 22  Meghna Yates MD   albuterol (PROVENTIL) (2.5 MG/3ML) 0.083% nebulizer solution Inhale into the lungs once  Patient not taking: Reported on 10/31/2022    Ar Automatic Reconciliation   buPROPion (WELLBUTRIN XL) 300 MG extended release tablet Take 300 mg by mouth every evening    Ar Automatic Reconciliation   cetirizine (ZYRTEC) 10 MG tablet Take by mouth daily as needed    Ar Automatic Reconciliation   escitalopram (LEXAPRO) 20 MG tablet Take 20 mg by mouth every evening    Ar Automatic Reconciliation   ibuprofen (ADVIL;MOTRIN) 200 MG tablet Take by mouth  Patient not taking: Reported on 10/28/2022    Ar Automatic Reconciliation   LORazepam (ATIVAN) 0.5 MG tablet Take by mouth.     Ar Automatic Reconciliation   topiramate (TOPAMAX) 25 MG tablet Take by mouth 2 times daily  Patient not taking: No sig reported    Ar Automatic Reconciliation       Current medications:    Current Facility-Administered Medications   Medication Dose Route Frequency Provider Last Rate Last Admin    lidocaine 1 % injection 1 mL  1 mL IntraDERmal Once PRN CARLIE Rowland MD        lactated ringers infusion   IntraVENous Continuous CARLIE Rowland MD 75 mL/hr at 10/31/22 0603 New Bag at 10/31/22 0603    sodium chloride flush 0.9 % injection 5-40 mL  5-40 mL IntraVENous 2 times per day CARLIE Rowland MD        sodium chloride flush 0.9 % injection 5-40 mL  5-40 mL IntraVENous PRN CARLIE Rowland MD        0.9 % sodium chloride infusion   IntraVENous PRN CARLIE Rowland MD        midazolam PF (VERSED) injection 2 mg  2 mg IntraVENous Once PRN CARLIE Rowland MD           Allergies: Allergies   Allergen Reactions    Amoxicillin-Pot Clavulanate Hives    Ciprofloxacin Other (See Comments)    Penicillins Other (See Comments)       Problem List:    Patient Active Problem List   Diagnosis Code    Family planning Z30.09    History of menorrhagia Z87.42    Sinusitis J32.9    Migraine G43.909    Malaise and fatigue R53.81, R53.83    Overweight E66.3    Dizziness R42    Depression F32. A    Low BP I95.9    Chest pain R07.9    ADD (attention deficit disorder) F98.8    Allergic rhinitis J30.9    Sleep disorder G47.9    Menorrhagia N92.0    Pap smear of cervix with ASCUS, cannot exclude HGSIL R87.611    Mild dysplasia of cervix N87.0       Past Medical History:        Diagnosis Date    ADD (attention deficit disorder)     Allergic rhinitis     Asthma     Exacerbation 12/21/2020 seen at [facility]    Depression     Diabetes [facility])     gestational    Hypertension     Migraine     Psychiatric disorder     anxiety    Right wrist sprain 03/05/2021    [facility], Worker's Comp 3/2/2021    Sleep disorder     Vertigo        Past Surgical History:        Procedure Laterality Date    CHOLECYSTECTOMY         Social History:    Social History     Tobacco Use    Smoking status: Never    Smokeless tobacco: Never   Substance Use Topics    Alcohol use:  No Counseling given: Not Answered      Vital Signs (Current):   Vitals:    10/28/22 0919 10/31/22 0551   BP:  (!) 129/93   Pulse:  (!) 116   Resp:  16   Temp:  97.8 °F (36.6 °C)   TempSrc:  Oral   SpO2:  99%   Weight: 162 lb (73.5 kg) 175 lb 8 oz (79.6 kg)   Height: 5' 2\" (1.575 m)                                               BP Readings from Last 3 Encounters:   10/31/22 (!) 129/93   10/17/22 128/74   06/21/22 118/72       NPO Status: Time of last liquid consumption: 2000                        Time of last solid consumption: 2000                        Date of last liquid consumption: 10/30/22                        Date of last solid food consumption: 10/30/22    BMI:   Wt Readings from Last 3 Encounters:   10/31/22 175 lb 8 oz (79.6 kg)   10/17/22 172 lb 12.8 oz (78.4 kg)   06/21/22 162 lb (73.5 kg)     Body mass index is 32.1 kg/m². CBC:   Lab Results   Component Value Date/Time    WBC 10.5 10/17/2022 02:41 PM    RBC 4.94 10/17/2022 02:41 PM    HGB 14.0 10/17/2022 02:41 PM    HCT 45.8 10/17/2022 02:41 PM    MCV 92.7 10/17/2022 02:41 PM    RDW 13.2 10/17/2022 02:41 PM     10/17/2022 02:41 PM       CMP:   Lab Results   Component Value Date/Time     10/17/2022 02:41 PM    K 4.2 10/17/2022 02:41 PM     10/17/2022 02:41 PM    CO2 26 10/17/2022 02:41 PM    BUN 12 10/17/2022 02:41 PM    CREATININE 0.80 10/17/2022 02:41 PM    LABGLOM >60 10/17/2022 02:41 PM    GLUCOSE 77 10/17/2022 02:41 PM    PROT 7.7 10/17/2022 02:41 PM    CALCIUM 9.9 10/17/2022 02:41 PM    BILITOT 0.2 10/17/2022 02:41 PM    ALKPHOS 116 10/17/2022 02:41 PM    AST 11 10/17/2022 02:41 PM    ALT 29 10/17/2022 02:41 PM       POC Tests: No results for input(s): POCGLU, POCNA, POCK, POCCL, POCBUN, POCHEMO, POCHCT in the last 72 hours.     Coags: No results found for: PROTIME, INR, APTT    HCG (If Applicable):   Lab Results   Component Value Date    PREGTESTUR Negative 10/31/2022        ABGs: No results found for: PHART, PO2ART, UXD9VED, PFD9ZMI, BEART, S3SMXHJW     Type & Screen (If Applicable):  No results found for: LABABO, LABRH    Drug/Infectious Status (If Applicable):  No results found for: HIV, HEPCAB    COVID-19 Screening (If Applicable): No results found for: COVID19        Anesthesia Evaluation  Patient summary reviewed and Nursing notes reviewed  Airway: Mallampati: II  TM distance: >3 FB   Neck ROM: full  Mouth opening: > = 3 FB   Dental: normal exam         Pulmonary: breath sounds clear to auscultation  (+) asthma (Rare prn MDI):                            Cardiovascular:Negative CV ROS  Exercise tolerance: good (>4 METS),           Rhythm: regular  Rate: normal                    Neuro/Psych:   (+) headaches: migraine headaches, psychiatric history (ADHD):depression/anxiety             GI/Hepatic/Renal: Neg GI/Hepatic/Renal ROS            Endo/Other: Negative Endo/Other ROS                    Abdominal:             Vascular: negative vascular ROS. Other Findings:           Anesthesia Plan      general     ASA 1       Induction: intravenous. Anesthetic plan and risks discussed with patient.                         Yovany Gatica MD   10/31/2022

## 2022-10-31 NOTE — DISCHARGE INSTRUCTIONS
You had oxycodone 5 mg at 0940, You can take your next pain pill at 3:40 PM  You had IV Toradol 30 mg at 0940, You need to wait to take the naprosyn at 5:40 PM    MEDICATION INTERACTION:    During your procedure you potentially received a medication or medications which may reduce the effectiveness of oral contraceptives. Please consider other forms of contraception for 1 month following your procedure if you are currently using oral contraceptives as your primary form of birth control. In addition to this, we recommend continuing your oral contraceptive as prescribed, unless otherwise instructed by your physician, during this time. After general anesthesia or intravenous sedation, for 24 hours or while taking prescription Narcotics:  Limit your activities  A responsible adult needs to be with you for the next 24 hours  Do not drive and operate hazardous machinery  Do not make important personal or business decisions  Do not drink alcoholic beverages  If you have not urinated within 8 hours after discharge, and you are experiencing discomfort from urinary retention, please go to the nearest ED. If you have sleep apnea and have a CPAP machine, please use it for all naps and sleeping. Please use caution when taking narcotics and any of your home medications that may cause drowsiness. *  Please give a list of your current medications to your Primary Care Provider. *  Please update this list whenever your medications are discontinued, doses are      changed, or new medications (including over-the-counter products) are added. *  Please carry medication information at all times in case of emergency situations. These are general instructions for a healthy lifestyle:  No smoking/ No tobacco products/ Avoid exposure to second hand smoke  Surgeon General's Warning:  Quitting smoking now greatly reduces serious risk to your health.   Obesity, smoking, and sedentary lifestyle greatly increases your risk for illness  A healthy diet, regular physical exercise & weight monitoring are important for maintaining a healthy lifestyle    You may be retaining fluid if you have a history of heart failure or if you experience any of the following symptoms:  Weight gain of 3 pounds or more overnight or 5 pounds in a week, increased swelling in our hands or feet or shortness of breath while lying flat in bed. Please call your doctor as soon as you notice any of these symptoms; do not wait until your next office visit.

## 2022-10-31 NOTE — DISCHARGE SUMMARY
Discharge Summary     Name: Saad Raymundo MRN: [id number]  SSN: [id number]    YOB: 1990  Age: 34 y.o. Sex: female      Allergies: Amoxicillin-pot clavulanate, Ciprofloxacin, and Penicillins    Admit Date: 10/31/2022    Discharge Date: 10/31/2022      Admitting Physician: Danny Hair MD     * Admission Diagnoses: Cervical dysplasia, Dysmenorrhea, and Menorrhagia  1)  Pap: ASCUS can't r/o HGSIL, colpo LGSIL. 2)  Menorrhagia & dysmenorrhea     * Discharge Diagnoses:      * Procedures: Operative Hysteroscopy  Cold Knife Cone    * Discharge Condition: Good    [facility] Course: Normal hospital course for this procedure.     Significant Diagnostic Studies:   Recent Results (from the past 24 hour(s))   TYPE AND SCREEN    Collection Time: 10/31/22  6:09 AM   Result Value Ref Range    Crossmatch expiration date 11/03/2022,2359     ABO/Rh O POSITIVE     Antibody Screen NEG    POC Pregnancy Urine Qual    Collection Time: 10/31/22  6:14 AM   Result Value Ref Range    Preg Test, Ur Negative NEG         * Disposition: Home    Discharge Medications:      Medication List        START taking these medications      drospirenone-ethinyl estradiol 3-0.02 MG per tablet  Commonly known as: DENISE  Take 1 tablet by mouth daily            CONTINUE taking these medications      amphetamine-dextroamphetamine 20 MG tablet  Commonly known as: ADDERALL     buPROPion 300 MG extended release tablet  Commonly known as: WELLBUTRIN XL     cetirizine 10 MG tablet  Commonly known as: ZYRTEC     escitalopram 20 MG tablet  Commonly known as: LEXAPRO     LORazepam 0.5 MG tablet  Commonly known as: ATIVAN     naproxen 500 MG EC tablet  Commonly known as: EC-Naprosyn  Take 1 tablet by mouth 2 times daily (with meals) For postop pain            STOP taking these medications      ibuprofen 200 MG tablet  Commonly known as: ADVIL;MOTRIN     medroxyPROGESTERone 150 MG/ML injection  Commonly known as: Depo-Provera            ASK your doctor about these medications      albuterol (2.5 MG/3ML) 0.083% nebulizer solution  Commonly known as: PROVENTIL     topiramate 25 MG tablet  Commonly known as: TOPAMAX               Where to Get Your Medications        These medications were sent to [address] [facility] [address], [address] [place] [address]      Phone: [phone]   drospirenone-ethinyl estradiol 3-0.02 MG per tablet          Follow-up Care/Patient Instructions: Activity: No sex, douching, or tampons for 6 weeks or as directed by your physician. No heavy lifting for 6 weeks. No driving while taking pain medication. Diet: Resume pre-hospital diet  Wound Care: as directed      Pelvic rest x 2 wk/until follow up ov (no sex, swimming, tub bathes tampons or douching)  Pt may drive after 2 days as long as she is NOT taking narcotics & can quickly maneuver her foot from the gas to the brake. For the next several days:  eat, shower and advance activity as tolerated. Notify [facility] for temp > 100.5, vaginal discharge with odor, heavy VB, worsening pelvic/abdominal pain and/or other concerns.      Discharge Meds:  Charisma  Oxycodone  Naprosyn EC   Otc tylenol     Ascencion Carmona MD, Kike Dias

## 2022-10-31 NOTE — ANESTHESIA POSTPROCEDURE EVALUATION
Department of Anesthesiology  Postprocedure Note    Patient: Mj Brasher  MRN: [id number]  YOB: 1990  Date of evaluation: 10/31/2022      Procedure Summary     Date: 10/31/22 Room / Location: [facility] MAIN OR 02 / [facility] MAIN OR    Anesthesia Start: 0725 Anesthesia Stop: 5795    Procedures:       CERVIX CONE BIOPSY (Pelvis)      DILATATION AND CURETTAGE (Cervix) Diagnosis:       Mild dysplasia of cervix      (Mild dysplasia of cervix [N87.0])    Surgeons: Julio Patel MD Responsible Provider: Boni Diane MD    Anesthesia Type: general ASA Status: 1          Anesthesia Type: No value filed.     Shadi Phase I:      Shadi Phase II:        Anesthesia Post Evaluation    Patient location during evaluation: PACU  Patient participation: complete - patient participated  Level of consciousness: awake and alert  Airway patency: patent  Nausea & Vomiting: no nausea and no vomiting  Complications: no  Cardiovascular status: hemodynamically stable  Respiratory status: acceptable, nonlabored ventilation and spontaneous ventilation  Hydration status: euvolemic  Comments: /74   Pulse 83   Temp 98.6 °F (37 °C) (Skin)   Resp 16   Ht 5' 2\" (1.575 m)   Wt 175 lb 8 oz (79.6 kg)   SpO2 99%   BMI 32.10 kg/m²     Multimodal analgesia pain management approach

## 2022-10-31 NOTE — INTERVAL H&P NOTE
Update History & Physical    The patient's History and Physical of October 31, 2022 was reviewed with the patient and I examined the patient. There was no change. The surgical site was confirmed by the patient and me. Plan: The risks, benefits, expected outcome, and alternative to the recommended procedure have been discussed with the patient. Patient understands and wants to proceed with the procedure.      Electronically signed by Ben Lopez MD, Janean Learn on 10/31/2022 at 7:20 AM

## 2022-10-31 NOTE — OP NOTE
Operative Note      Patient: Jaswant Caro  YOB: 1990  MRN: [id number]    Date of Procedure: 10/31/2022    Pre-Op Diagnosis: Mild dysplasia of cervix [N87.0]  1)  6/2022 Pap: ASCUS can't r/o HGSIL, colpo LGSIL. 2)  Menorrhagia & dysmenorrhea, wants concomitant D&C hysteroscopy, with endometrial ablation    Post-Op Diagnosis: Same       Procedure(s):  CERVIX CONE BIOPSY  DILATATION AND CURETTAGE    Surgeon(s):  Magy Major MD    Assistant:   * No surgical staff found *    Anesthesia: General with LMA    Estimated Blood Loss (mL): Minimal    Complications: None    Specimens:   ID Type Source Tests Collected by Time Destination   A : endometrial currettings  Tissue Endometrium SURGICAL PATHOLOGY Magy Major MD 10/31/2022 1210    B : endocervical currettings Tissue Uterus SURGICAL PATHOLOGY Magy Major MD 10/31/2022 0840    C : cervical cone biopsy Tissue Cervix SURGICAL PATHOLOGY Magy Major MD 10/31/2022 6866      Implants:  * No implants in log *      Drains: * No LDAs found *    Findings: EUA revealed:   nl external genitalia:  no abnormalities seen  Vagina:    pink with normal appearing locations, no abnormal appearing tissue  Cervix:  normal appearing, no lesions/abnormalities seen  Uterus approximately 8-9 wk size, no adnexal masses. Endocervical canal normal appearing, 4 cm long  Endometrial Cavity sounded to 8 cm, cavity ~ 4 cm long   Cavity width 3.5 cm  Ablation performed for:  1 minute 34 seconds at a power of 17 gloria  Hysteroscopic Findings:  Endocervical canal & endometrial cavity normal-appearing. Minimal fluffy tissue noted throughout, no abnormal appearing tissue, no polyps  Bilateral ostia seen/confirmed/normal appearing. Procedure:  After obtaining informed consent pt was taken to the OR where anesthesia was obtained via general with LMA. Pt prepped & draped in the usual sterile fashion.   EUA revealed:   nl external genitalia/vagina & cervix, uterus approximately 8-9 wk size, no adnexal masses, rectovaginal exam confirmatory for above with nl sphincter tone. Weighted speculum placed in the vagina. Single tooth tenaculum grasped the anterior lip of the cervix. Uterus sounded to 8 (cavity curved). Cervix sequentially dilated with Magdiel Spearing dilators. Hysteroscope advanced under direct visualization. Cavity surveyed in a panoramic fashion with above findings noted. Cavity measurements confirmed hysteroscopically. Uterine depth 8 cm,  cervical length 4 cm. Cavity depth 4 cm. Hysteroscope removed. After surveying the canal and cavity and confirming there was no perforation the hysteroscope was removed. The Novasure device was advanced, tested and proper feedback noted with array deployment. Device advanced to the upper fundus, array deployed & seated in the usual fashion. Cavity width determined to be 3.5 cm. Cavity integrity tested performed  and noted to be within normal limits/no evidence for perforation. Ablation begun & performed for 1 minute and 34 seconds at a power of  17 gloria. Typical fluid /tissue collection was noted in the cannister. At the completion of the ablation the array was allowed to cool then retracted back into the device. Device removed. Hysteroscope again advanced under direct visualization and cavity surveyed. No evidence of perforation was noted & typical \"moonscape\" effect was noted throughout the cavity. Attention turned to the C. There were no obvious lesions on the ectocervix. Cervix and vagina painted with lugol's solution. Lugol's + lesion/small patch noted ~10-12:00 at the external os. Two 0 vicryl sutures were placed @ 3 & 9:00 to facilitate cervical manipulation   intra op. The intended cervical incision site was locally injected with dilute jaime synephrine.     Bovie cautery used to delineate the anticipated excision site lateral to the margins of anticipated cervical abnormality    An #11 angled blade was used to obtain a cone-shaped biopsy of the cervix. Curved Francis scissors were used to complete the excision. ECC was subsequently collected. Hemostasis was obtained at the cone bed with the Bovie. In addition the ectocervix was also ablated with the bovey cautery in an effort to destroy any residual microscopic disease. 0 Vicryl on a UR 6 interrupted figure-of-eight's were also placed within the excision site ~5-8:00 to optimize hemostasis. Monsels solution was applied as a prophylactic measure over the [facility] site  One small piece of surgicel was placed over the [facility] operative site against the cone bed. The two stay sutures at 9 oclock and 3 oclock were tied together to secure the surgicel to the site. Good hemostasis was noted. After achieving complete hemostasis the weighted speculum was removed from the vagina. Patient was returned to the dorsal supine position, awakened and transferred to the recovery room in stable condition. Events of surgery were reviewed. She will follow up with me in 2 weeks, sooner should she have any complications thought to be related to her procedure. I reviewed the importance of:  pelvic rest, nothing in the vagina, no:  sex, tampons douches, tub baths or swimming until her follow up appointment. Also reviewed:  infection, driving and physical activity precautions. Pt was given prescriptions for post op pain medications at her pre op appointment:   Oxycodone 2.5 - 5 mg p.o. every 6 hours prn # 12 NR. Naprosyn ER # 20 NR was also rxd today.          Electronically signed by Rodney Hobbs MD, [facility] on 10/31/2022 at 9:05 AM

## 2022-11-10 ENCOUNTER — TELEPHONE (OUTPATIENT)
Dept: OBGYN CLINIC | Age: 32
End: 2022-11-10

## 2022-11-10 ENCOUNTER — CLINICAL DOCUMENTATION (OUTPATIENT)
Dept: OBGYN CLINIC | Age: 32
End: 2022-11-10

## 2022-11-10 DIAGNOSIS — Z98.890 POST-OPERATIVE STATE: ICD-10-CM

## 2022-11-10 DIAGNOSIS — R50.9 ELEVATED TEMPERATURE: Primary | ICD-10-CM

## 2022-11-10 DIAGNOSIS — N89.8 VAGINAL DISCHARGE: ICD-10-CM

## 2022-11-10 RX ORDER — METRONIDAZOLE 500 MG/1
500 TABLET ORAL 2 TIMES DAILY
Qty: 14 TABLET | Refills: 0 | Status: SHIPPED | OUTPATIENT
Start: 2022-11-10

## 2022-11-10 RX ORDER — DOXYCYCLINE HYCLATE 100 MG
TABLET ORAL
Qty: 14 TABLET | Refills: 0 | Status: SHIPPED | OUTPATIENT
Start: 2022-11-10

## 2022-11-10 NOTE — TELEPHONE ENCOUNTER
Sheri Wills called with c/o low grade fever. . She is not having any other symptoms.  Cervical Cone Biopsy on 10/31/22

## 2022-11-10 NOTE — PROGRESS NOTES
Regarding note below: Patient is POD #11  s/p D&C hysteroscopy with endometrial ablation & cold knife cone. Called and spoke with patient. Reports a low-grade temperature that started yesterday. Denies pelvic pain. Reports some discharge, possibly a change in the discharge (is not sure). Did not describe exudate, is also having intermittent tolerable cramping since her surgery. Denies: Nausea, vomiting, cough, myalgias, heavy vaginal bleeding. Pt advised to come to the office tomorrow for Ceftriaxone 1 gm IM and po abx sent to her pharmacy today, [facility]. Orders Placed This Encounter   Medications    metroNIDAZOLE (FLAGYL) 500 MG tablet     Sig: Take 1 tablet by mouth 2 times daily Do not drink alcohol while taking this medication     Dispense:  14 tablet     Refill:  0    doxycycline hyclate (VIBRA-TABS) 100 MG tablet     Si mg po every 12 hours. Dispense:  14 tablet     Refill:  0       11/10/2022                 Signed                   Tiffani Villegas called with c/o low grade fever. . She is not having any other symptoms.  Cervical Cone Biopsy on 10/31/22

## 2022-11-11 ENCOUNTER — NURSE ONLY (OUTPATIENT)
Dept: OBGYN CLINIC | Age: 32
End: 2022-11-11

## 2022-11-11 DIAGNOSIS — R50.9 FEVER, UNSPECIFIED FEVER CAUSE: Primary | ICD-10-CM

## 2022-11-11 DIAGNOSIS — N73.9 PELVIC INFECTION IN FEMALE: ICD-10-CM

## 2022-11-16 RX ORDER — DOXYCYCLINE 100 MG/1
100 CAPSULE ORAL 2 TIMES DAILY
Qty: 14 CAPSULE | Refills: 0 | Status: SHIPPED | OUTPATIENT
Start: 2022-11-16

## 2023-03-18 ENCOUNTER — CLINICAL DOCUMENTATION (OUTPATIENT)
Dept: OBGYN CLINIC | Age: 33
End: 2023-03-18

## 2023-03-27 ENCOUNTER — OFFICE VISIT (OUTPATIENT)
Dept: OBGYN CLINIC | Age: 33
End: 2023-03-27
Payer: MEDICAID

## 2023-03-27 VITALS
DIASTOLIC BLOOD PRESSURE: 78 MMHG | BODY MASS INDEX: 31.65 KG/M2 | SYSTOLIC BLOOD PRESSURE: 118 MMHG | HEIGHT: 62 IN | WEIGHT: 172 LBS

## 2023-03-27 DIAGNOSIS — N92.6 MISSED MENSES: ICD-10-CM

## 2023-03-27 DIAGNOSIS — N89.8 ITCHING IN THE VAGINAL AREA: Primary | ICD-10-CM

## 2023-03-27 LAB — HCG SERPL QL: NEGATIVE

## 2023-03-27 PROCEDURE — 99214 OFFICE O/P EST MOD 30 MIN: CPT | Performed by: OBSTETRICS & GYNECOLOGY

## 2023-03-30 ENCOUNTER — PATIENT MESSAGE (OUTPATIENT)
Dept: OBGYN CLINIC | Age: 33
End: 2023-03-30

## 2023-03-30 LAB
A VAGINAE DNA VAG QL NAA+PROBE: NORMAL SCORE
BVAB2 DNA VAG QL NAA+PROBE: NORMAL SCORE
C ALBICANS DNA VAG QL NAA+PROBE: NEGATIVE
C GLABRATA DNA VAG QL NAA+PROBE: NEGATIVE
C TRACH RRNA SPEC QL NAA+PROBE: NEGATIVE
CANDIDA KRUSEI: NEGATIVE
CANDIDA LUSITANIAE, NAA, 180015: NEGATIVE
CANDIDA PARAPSILOSIS/TROPICALIS: NEGATIVE
MEGA1 DNA VAG QL NAA+PROBE: NORMAL SCORE
N GONORRHOEA RRNA SPEC QL NAA+PROBE: NEGATIVE
T VAGINALIS RRNA SPEC QL NAA+PROBE: NEGATIVE

## 2023-04-04 NOTE — PROGRESS NOTES
taking: Reported on 3/27/2023)    metroNIDAZOLE (FLAGYL) 500 MG tablet Take 1 tablet by mouth 2 times daily Do not drink alcohol while taking this medication (Patient not taking: Reported on 3/27/2023)    doxycycline hyclate (VIBRA-TABS) 100 MG tablet 100 mg po every 12 hours. (Patient not taking: Reported on 3/27/2023)    naproxen (EC-NAPROSYN) 500 MG EC tablet Take 1 tablet by mouth 2 times daily (with meals) For postop pain (Patient not taking: Reported on 3/27/2023)    albuterol (PROVENTIL) (2.5 MG/3ML) 0.083% nebulizer solution Inhale into the lungs once (Patient not taking: Reported on 10/31/2022)    topiramate (TOPAMAX) 25 MG tablet Take by mouth 2 times daily (Patient not taking: No sig reported)     No current facility-administered medications for this visit.        Past Medical History:   Diagnosis Date    ADD (attention deficit disorder)     Allergic rhinitis     Asthma     Exacerbation 12/21/2020 seen at [facility] ER    Depression     Diabetes [facility])     gestational    Hypertension     Migraine     Psychiatric disorder     anxiety    Right wrist sprain 03/05/2021    [facility], Worker's Comp 3/2/2021    Sleep disorder     Vertigo        Past Surgical History:   Procedure Laterality Date    CERVIX BIOPSY N/A 10/31/2022    CERVIX CONE BIOPSY performed by Jessica Lr MD at [facility] N/A 10/31/2022    DILATATION AND CURETTAGE performed by Jessica Lr MD at [address] [place] [address]  10/31/2022       Social History     Socioeconomic History    Marital status: Single     Spouse name: None    Number of children: None    Years of education: None    Highest education level: None   Occupational History    Occupation:    Tobacco Use    Smoking status: Never    Smokeless tobacco: Never   Vaping Use    Vaping Use: Never used   Substance and Sexual Activity    Alcohol use: No    Drug use: No    Sexual activity: Not

## 2023-05-18 ENCOUNTER — CLINICAL DOCUMENTATION (OUTPATIENT)
Dept: OBGYN CLINIC | Age: 33
End: 2023-05-18

## 2023-05-18 DIAGNOSIS — Z30.2 REQUEST FOR STERILIZATION: Primary | ICD-10-CM

## (undated) DEVICE — SOLUTION IRRIG 1000ML 0.9% SOD CHL USP POUR PLAS BTL

## (undated) DEVICE — GARMENT,MEDLINE,DVT,INT,CALF,MED, GEN2: Brand: MEDLINE

## (undated) DEVICE — PVC URETHRAL CATHETER: Brand: DOVER

## (undated) DEVICE — GLOVE SURG SZ 65 THK91MIL LTX FREE SYN POLYISOPRENE

## (undated) DEVICE — AGENT HEMSTAT W2XL14IN OXIDIZED REGENERATED CELOS ABSRB FOR

## (undated) DEVICE — PAD,NON-ADHERENT,3X8,STERILE,LF,1/PK: Brand: MEDLINE

## (undated) DEVICE — INTENDED FOR TISSUE SEPARATION, AND OTHER PROCEDURES THAT REQUIRE A SHARP SURGICAL BLADE TO PUNCTURE OR CUT.: Brand: BARD-PARKER ® STAINLESS STEEL BLADES

## (undated) DEVICE — CARDINAL HEALTH FLEXIBLE LIGHT HANDLE COVER: Brand: CARDINAL HEALTH

## (undated) DEVICE — MINOR LITHOTOMY PACK: Brand: MEDLINE INDUSTRIES, INC.

## (undated) DEVICE — CANISTER, RIGID, 2000CC: Brand: MEDLINE INDUSTRIES, INC.

## (undated) DEVICE — DRAPE,UNDERBUTTOCKS,PCH,STERILE: Brand: MEDLINE

## (undated) DEVICE — APPLICATOR SWAB L16IN RAYON POLYPR TIP SHFT PROCTOSCOPIC

## (undated) DEVICE — SUTURE SZ 0 27IN 5/8 CIR UR-6  TAPER PT VIOLET ABSRB VICRYL J603H

## (undated) DEVICE — SOLUTION IRRIG 3000ML 0.9% SOD CHL USP UROMATIC PLAS CONT

## (undated) DEVICE — LITHOTOMY: Brand: MEDLINE INDUSTRIES, INC.

## (undated) DEVICE — HANDPIECE ABLAT DIA6MM ENDOMET IMPED CTRL DEV DISP NOVASURE

## (undated) DEVICE — SYRINGE NDL 25GA 1ML L5/8IN BLU PLAS NDL S STL SHLD HYPO

## (undated) DEVICE — DECANTER FLD 9IN ST BG FOR ASEP TRNSF OF FLD

## (undated) DEVICE — CYSTO/BLADDER IRRIGATION SET, REGULATING CLAMP